# Patient Record
Sex: MALE | Race: WHITE | NOT HISPANIC OR LATINO | URBAN - NONMETROPOLITAN AREA
[De-identification: names, ages, dates, MRNs, and addresses within clinical notes are randomized per-mention and may not be internally consistent; named-entity substitution may affect disease eponyms.]

---

## 2021-07-14 ENCOUNTER — IMPORTED ENCOUNTER (OUTPATIENT)
Dept: URBAN - NONMETROPOLITAN AREA CLINIC 1 | Facility: CLINIC | Age: 46
End: 2021-07-14

## 2021-07-14 PROBLEM — H16.042: Noted: 2021-07-14

## 2021-07-14 PROCEDURE — 99204 OFFICE O/P NEW MOD 45 MIN: CPT

## 2021-07-14 NOTE — PATIENT DISCUSSION
*CORNEAL ULCER:. os-  Discussed findings of exam in detail with the patient. -  Patients questions were answered. -  Discussed the serious nature of this condition and treatment options with the patient. -  If the patient wears contact lenses they were instructed to discontinue the use of their contact lenses immediately and counseled about the importance of proper contact lens care. start maxitrol 1gtt qid os x 1wk

## 2022-04-09 ASSESSMENT — VISUAL ACUITY
OD_CC: 20/70
OS_CC: 20/70